# Patient Record
Sex: FEMALE | Race: WHITE | Employment: FULL TIME | URBAN - NONMETROPOLITAN AREA
[De-identification: names, ages, dates, MRNs, and addresses within clinical notes are randomized per-mention and may not be internally consistent; named-entity substitution may affect disease eponyms.]

---

## 2019-05-28 ENCOUNTER — APPOINTMENT (OUTPATIENT)
Dept: GENERAL RADIOLOGY | Age: 49
End: 2019-05-28
Payer: COMMERCIAL

## 2019-05-28 ENCOUNTER — HOSPITAL ENCOUNTER (EMERGENCY)
Age: 49
Discharge: HOME | End: 2019-05-28
Attending: FAMILY MEDICINE
Payer: COMMERCIAL

## 2019-05-28 VITALS
SYSTOLIC BLOOD PRESSURE: 146 MMHG | OXYGEN SATURATION: 96 % | RESPIRATION RATE: 18 BRPM | HEART RATE: 87 BPM | DIASTOLIC BLOOD PRESSURE: 82 MMHG | TEMPERATURE: 96.9 F

## 2019-05-28 DIAGNOSIS — M20.62 TOE DEFORMITY, LEFT: Primary | ICD-10-CM

## 2019-05-28 PROCEDURE — 99283 EMERGENCY DEPT VISIT LOW MDM: CPT

## 2019-05-28 PROCEDURE — 73660 X-RAY EXAM OF TOE(S): CPT

## 2019-05-29 ENCOUNTER — TELEPHONE (OUTPATIENT)
Dept: FAMILY MEDICINE CLINIC | Age: 49
End: 2019-05-29

## 2019-05-29 NOTE — ED PROVIDER NOTES
Musculoskeletal: She exhibits tenderness. She exhibits no edema or deformity. Left foot: There is tenderness. Feet:    There is a small bump/cyst that is noted on the left middle toe dorsal aspect at the proximal phalange area   Skin: Skin is dry. Capillary refill takes less than 2 seconds. No rash noted. Psychiatric: She has a normal mood and affect. Nursing note and vitals reviewed. DIFFERENTIAL DIAGNOSIS:   Fracture, cyst,    DIAGNOSTIC RESULTS     EKG: All EKG's are interpreted by the Emergency Department Physician who either signs or Co-signs this chart in the absence of a cardiologist.      RADIOLOGY: non-plain film images(s) such as CT, Ultrasound and MRI are read by the radiologist.  Plain radiographic images are visualized and preliminarilyinterpreted by the emergency physician unless otherwise stated below.      XR TOE LEFT (MIN 2 VIEWS) (Final result)   Result time 05/28/19 21:07:01   Final result by Dimas Arredondo MD (05/28/19 21:07:01)                Impression:    No acute fracture or dislocation. **This report has been created using voice recognition software. It may contain minor errors which are inherent in voice recognition technology. **    Final report electronically signed by Dr. Kamala Hill on 5/28/2019 9:07 PM            Narrative:    PROCEDURE: XR TOE LEFT (MIN 2 VIEWS)    CLINICAL INFORMATION: middle toe pain and lump . COMPARISON: No prior study. TECHNIQUE: An AP view of the left foot and 2 views of the third digit were obtained. FINDINGS:    Generative changes. Narrowing of the interphalangeal joints. No acute fracture or dislocation. Correlate for point tenderness. Soft tissues are unremarkable.                      LABS:   Labs Reviewed - No data to display    EMERGENCY DEPARTMENT COURSE(MDM):    Vitals:    Vitals:    05/28/19 2040   BP: (!) 146/82   Pulse: 87   Resp: 18   Temp: 96.9 °F (36.1 °C)   TempSrc: Oral   SpO2: 96%     Appreciated a small cyst or bone deformity to the left middle toe. X-rays of the left foot with specific view of the left middle toe did not show any acute fracture or dislocation. This could be some type of cyst I am unsure. Because it is causing some discomfort to the patient I will have her further follow-up with podiatry for further evaluation. Care instructions were provided. CRITICAL CARE:       CONSULTS:  None    PROCEDURES:  None    FINAL IMPRESSION      1. Toe deformity, left          DISPOSITION/PLAN   Home. Care instructions provided. Follow-up with PCP/podiatry if pain persists    PATIENT REFERRED TO:  Kris Roa DPM  [address] [address]  [phone]    Schedule an appointment as soon as possible for a visit in 1 day        DISCHARGE MEDICATIONS:  There are no discharge medications for this patient.       (Please note that portionsof this note were completed with a voice recognition program.  Efforts were made to edit the dictations but occasionally words are mis-transcribed.)    MD Noah Fajardo MD  05/29/19 2916

## 2019-05-29 NOTE — LETTER
May 29, 2019    Fan Weber  [address] [facility] [address]    Dear Jose Christie,    This letter is regarding your Emergency Department (ED) visit at [facility] on 5/28/19. Dr.Mandy LAITH Carrion wanted to make sure that you understand your discharge instructions and that you were able to fill any prescriptions that may have been ordered for you. Please contact the office at the above phone number if the ED advised you to follow up with Dr.Mandy Ana Mccullough, or if you have any further questions or needs. Also did you know -   *Visiting the ED for a non-emergency could result in higher co-pays than you would normally be subject to paying? *After business hours you can reach Call-A-Nurse so they can direct you to the most appropriate care. [facility]) practices can often offer you an appointment on the same day that you call for acute issues. *We have some [facility] offices that offer Walk-in appointments; check our website for availability in your community, [email or web address].      *Evisits are now available for patients for through [address] If you do not have MyChart and are interested, please contact the office and a staff member may assist you or go to [email or web address].     Sincerely,   Monica French MD and your [facility]

## 2020-05-05 ENCOUNTER — NURSE ONLY (OUTPATIENT)
Dept: LAB | Age: 50
End: 2020-05-05

## 2021-10-30 ENCOUNTER — HOSPITAL ENCOUNTER (EMERGENCY)
Age: 51
Discharge: HOME | End: 2021-10-30
Attending: EMERGENCY MEDICINE
Payer: COMMERCIAL

## 2021-10-30 ENCOUNTER — APPOINTMENT (OUTPATIENT)
Dept: GENERAL RADIOLOGY | Age: 51
End: 2021-10-30
Payer: COMMERCIAL

## 2021-10-30 VITALS
DIASTOLIC BLOOD PRESSURE: 66 MMHG | BODY MASS INDEX: 38.51 KG/M2 | RESPIRATION RATE: 17 BRPM | SYSTOLIC BLOOD PRESSURE: 140 MMHG | WEIGHT: 260 LBS | HEIGHT: 69 IN | HEART RATE: 88 BPM | OXYGEN SATURATION: 97 % | TEMPERATURE: 98.1 F
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
## 2021-10-30 DIAGNOSIS — S40.012A CONTUSION OF LEFT SHOULDER, INITIAL ENCOUNTER: ICD-10-CM

## 2021-10-30 PROCEDURE — 73030 X-RAY EXAM OF SHOULDER: CPT

## 2021-10-30 PROCEDURE — 6370000000 HC RX 637 (ALT 250 FOR IP): Performed by: EMERGENCY MEDICINE

## 2021-10-30 PROCEDURE — 99284 EMERGENCY DEPT VISIT MOD MDM: CPT

## 2021-10-30 RX ORDER — IBUPROFEN 800 MG/1
800 TABLET ORAL ONCE
Status: COMPLETED | OUTPATIENT
Start: 2021-10-30 | End: 2021-10-30

## 2021-10-30 RX ORDER — HYDROCODONE BITARTRATE AND ACETAMINOPHEN 5; 325 MG/1; MG/1
1 TABLET ORAL EVERY 6 HOURS PRN
Qty: 10 TABLET | Refills: 0 | Status: SHIPPED | OUTPATIENT
Start: 2021-10-30 | End: 2021-11-02

## 2021-10-30 RX ORDER — HYDROCODONE BITARTRATE AND ACETAMINOPHEN 5; 325 MG/1; MG/1
2 TABLET ORAL ONCE
Status: COMPLETED | OUTPATIENT
Start: 2021-10-30 | End: 2021-10-30

## 2021-10-30 RX ADMIN — IBUPROFEN 800 MG: 800 TABLET, FILM COATED ORAL at 19:51

## 2021-10-30 RX ADMIN — HYDROCODONE BITARTRATE AND ACETAMINOPHEN 2 TABLET: 5; 325 TABLET ORAL at 19:51

## 2021-10-30 ASSESSMENT — ENCOUNTER SYMPTOMS: BACK PAIN: 0

## 2021-10-30 ASSESSMENT — PAIN SCALES - GENERAL: PAINLEVEL_OUTOF10: 10

## 2021-10-30 NOTE — ED PROVIDER NOTES
[facility]  eMERGENCY dEPARTMENT eNCOUnter             [address] 21 COMPLAINT    Chief Complaint   Patient presents with    Shoulder Injury    Shoulder Pain       Nurses Notes reviewed and I agree except as noted in the HPI. HPI    Martha Salazar is a 54 y.o. female who presents evaluation of injury to her left shoulder, sustained just prior to arrival when she was at a football game\" slid down a hill \". She landed on her left shoulder, and has severe pain laterally and posteriorly in the left shoulder. She has difficulty moving the arm. She has no numbness, tingling, or weakness in her hand. No pain in the elbow. No other areas of injury. Current pain is 10/10, aching, constant in the left shoulder, worse with any attempts at movement or with palpation. No treatment tried prior to arrival.    REVIEW OF SYSTEMS      Review of Systems   Constitutional: Negative. HENT: Negative. Musculoskeletal: Positive for falls. Negative for back pain and neck pain. Neurological: Negative for sensory change and focal weakness. All other systems reviewed and are negative. PAST MEDICAL HISTORY     has a past medical history of Chicken pox. SURGICAL HISTORY     has a past surgical history that includes Tubal ligation and  section. CURRENT MEDICATIONS    Discharge Medication List as of 10/30/2021  8:34 PM          ALLERGIES    is allergic to sulfa antibiotics. FAMILY HISTORY    She indicated that the status of her father is unknown. She indicated that the status of her maternal grandmother is unknown.   family history includes Diabetes in her maternal grandmother; Heart Disease (age of onset: 48) in her father. SOCIAL HISTORY     reports that she has never smoked. She has never used smokeless tobacco. She reports that she does not drink alcohol and does not use drugs.     PHYSICAL EXAM       INITIAL VITALS: BP (!) 140/66   Pulse 88 Temp 98.1 °F (36.7 °C) (Oral)   Resp 17   Ht 5' 9\" (1.753 m)   Wt 260 lb (117.9 kg)   SpO2 97%   BMI 38.40 kg/m²      Physical Exam  Vitals and nursing note reviewed. Exam conducted with a chaperone present. Constitutional:       General: She is in acute distress. Appearance: She is not toxic-appearing. HENT:      Head: Atraumatic. Mouth/Throat:      Comments: mask  Eyes:      Pupils: Pupils are equal, round, and reactive to light. Cardiovascular:      Rate and Rhythm: Normal rate and regular rhythm. Pulses: Normal pulses. Heart sounds: No murmur heard. Pulmonary:      Effort: Pulmonary effort is normal. No respiratory distress. Breath sounds: Normal breath sounds. Musculoskeletal:         General: Tenderness (Left shoulder, lateral and posterior, basically no range of motion of the left shoulder due to pain. No obvious deformity, mild swelling, no bruising. Distal function is intact. No tenderness over the clavicle. She is tender over the scapula area.) present. Cervical back: Neck supple. No rigidity or tenderness. Lymphadenopathy:      Cervical: No cervical adenopathy. Skin:     General: Skin is warm and dry. Capillary Refill: Capillary refill takes less than 2 seconds. Neurological:      General: No focal deficit present. Mental Status: She is alert and oriented to person, place, and time. Psychiatric:      Comments: Pain behavior         RADIOLOGY:    XR SHOULDER LEFT (MIN 2 VIEWS)   Final Result      No fracture or dislocation. Final report electronically signed by Dr. Jovany Roger on 10/30/2021 8:10 PM                Vitals:    Vitals:    10/30/21 2045   BP: (!) 140/66   Pulse: 88   Resp: 17   Temp: 98.1 °F (36.7 °C)   TempSrc: Oral   SpO2: 97%   Weight: 260 lb (117.9 kg)   Height: 5' 9\" (1.753 m)       EMERGENCY DEPARTMENT COURSE:    She was given ibuprofen and Norco for pain. She is feeling a little better.   X-ray results and plan of care discussed with the patient. She is placed in a sling. Normal neurovascular status before and after placement. I recommended gentle range of motion with the sling in place, ice, head elevation, rest.  She will follow up with her own physician Monday. FINAL IMPRESSION      1. Fall from slip, trip, or stumble, initial encounter    2. Contusion of left shoulder, initial encounter        DISPOSITION/PLAN    DISPOSITION Decision To Discharge 10/30/2021 08:17:55 PM      PATIENT REFERRED TO:    Meek Morales , [address]  [address] [place] [address]  [phone]    Schedule an appointment as soon as possible for a visit         DISCHARGE MEDICATIONS:    Discharge Medication List as of 10/30/2021  8:34 PM      START taking these medications    Details   HYDROcodone-acetaminophen (NORCO) 5-325 MG per tablet Take 1 tablet by mouth every 6 hours as needed for Pain for up to 3 days. Intended supply: 3 days.  Take lowest dose possible to manage pain, Disp-10 tablet, R-0Normal                (Please note that portions of this note were completed with a voice recognition program.  Efforts were made to edit the dictations but occasionally words are mis-transcribed.)      Gilberto Weaver MD  10/30/21 2966

## 2021-10-30 NOTE — Clinical Note
Starla Tatum was seen and treated in our emergency department on 10/30/2021. She may return to work on 11/02/2021. If you have any questions or concerns, please don't hesitate to call.       April Renee MD

## 2021-10-30 NOTE — ED TRIAGE NOTES
Pt comes walking into ER and walked to room 1. The patient was at a local [employer] football game when she fell down a hill, She had the school trained look at it and she was told that it is probably dislocated.

## 2021-10-31 NOTE — ED NOTES
Pt stable A&O x 3 given discharge and follow up info. Pt voiced no concerns and discharged from ER to self to home. Pt ambulated out of ER with no complications .        Eddy Middleton RN  10/30/21 2040

## 2022-05-01 ENCOUNTER — HOSPITAL ENCOUNTER (EMERGENCY)
Age: 52
Discharge: HOME | End: 2022-05-01
Attending: FAMILY MEDICINE

## 2022-05-01 ENCOUNTER — APPOINTMENT (OUTPATIENT)
Dept: GENERAL RADIOLOGY | Age: 52
End: 2022-05-01

## 2022-05-01 VITALS
BODY MASS INDEX: 40.81 KG/M2 | HEIGHT: 67 IN | TEMPERATURE: 97.6 F | DIASTOLIC BLOOD PRESSURE: 93 MMHG | SYSTOLIC BLOOD PRESSURE: 177 MMHG | OXYGEN SATURATION: 100 % | RESPIRATION RATE: 16 BRPM | WEIGHT: 260 LBS | HEART RATE: 100 BPM
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
## 2022-05-01 DIAGNOSIS — M79.602 PAIN IN LEFT ARM: ICD-10-CM

## 2022-05-01 PROCEDURE — 73030 X-RAY EXAM OF SHOULDER: CPT

## 2022-05-01 PROCEDURE — 73080 X-RAY EXAM OF ELBOW: CPT

## 2022-05-01 PROCEDURE — 73110 X-RAY EXAM OF WRIST: CPT

## 2022-05-01 PROCEDURE — 99283 EMERGENCY DEPT VISIT LOW MDM: CPT

## 2022-05-01 PROCEDURE — 6370000000 HC RX 637 (ALT 250 FOR IP): Performed by: FAMILY MEDICINE

## 2022-05-01 RX ORDER — HYDROCODONE BITARTRATE AND ACETAMINOPHEN 5; 325 MG/1; MG/1
1 TABLET ORAL ONCE
Status: COMPLETED | OUTPATIENT
Start: 2022-05-01 | End: 2022-05-01

## 2022-05-01 RX ADMIN — HYDROCODONE BITARTRATE AND ACETAMINOPHEN 1 TABLET: 5; 325 TABLET ORAL at 21:23

## 2022-05-01 ASSESSMENT — PAIN SCALES - GENERAL
PAINLEVEL_OUTOF10: 10
PAINLEVEL_OUTOF10: 10

## 2022-05-01 ASSESSMENT — PAIN - FUNCTIONAL ASSESSMENT: PAIN_FUNCTIONAL_ASSESSMENT: 0-10

## 2022-05-01 ASSESSMENT — ENCOUNTER SYMPTOMS
VOMITING: 0
NAUSEA: 0

## 2022-05-02 NOTE — ED NOTES
Discharge teaching and instructions for condition explained to patient by [facility] RN. AVS reviewed. Went over prescriptions with patient. Patient voiced understanding regarding prescriptions, follow up appointments and care of self at home. Pt discharged to home in stable condition with friend.        Yoko New RN  05/01/22 0960

## 2022-05-02 NOTE — ED PROVIDER NOTES
[facility]  eMERGENCY dEPARTMENT eNCOUnter          [address] [facility]       Chief Complaint   Patient presents with    Fall     let arm, shoulder, elbow pain       Nurses Notes reviewed and I agree except as noted in the HPI. HISTORY OF PRESENT ILLNESS    Getachew Macias is a 54 y.o. female who presents post fall. Patient notes left shoulder,left elbow,left wrist pain. Pain severe. Denies alleviating measures prior to arrival.         REVIEW OF SYSTEMS     Review of Systems   Constitutional: Positive for activity change. Negative for chills and fever. Gastrointestinal: Negative for nausea and vomiting. Musculoskeletal: Positive for arthralgias. Negative for joint swelling. Skin: Negative for rash and wound. All other systems reviewed and are negative. PAST MEDICAL HISTORY    has a past medical history of Chicken pox. SURGICAL HISTORY      has a past surgical history that includes Tubal ligation and  section. CURRENT MEDICATIONS     There are no discharge medications for this patient. ALLERGIES     is allergic to sulfa antibiotics. FAMILY HISTORY     She indicated that the status of her father is unknown. She indicated that the status of her maternal grandmother is unknown.   family history includes Diabetes in her maternal grandmother; Heart Disease (age of onset: 48) in her father. SOCIAL HISTORY      reports that she has never smoked. She has never used smokeless tobacco. She reports that she does not drink alcohol and does not use drugs. PHYSICAL EXAM     INITIAL VITALS:  height is 5' 7\" (1.702 m) and weight is 260 lb (117.9 kg). Her temperature is 97.6 °F (36.4 °C). Her blood pressure is 177/93 (abnormal) and her pulse is 100. Her respiration is 16 and oxygen saturation is 100%. Physical Exam  Vitals and nursing note reviewed. Constitutional:       General: She is in acute distress. HENT:      Head: Normocephalic and atraumatic. Musculoskeletal:         General: Tenderness and signs of injury present. No swelling or deformity. Left shoulder: No swelling, deformity, tenderness or bony tenderness. Decreased range of motion. Left elbow: No swelling, deformity or lacerations. Decreased range of motion. Tenderness present in radial head and olecranon process. Left wrist: Tenderness present. No swelling, deformity, snuff box tenderness or crepitus. Decreased range of motion. Normal pulse. Neurological:      Mental Status: She is alert. DIFFERENTIAL DIAGNOSIS:   Contusion,fracture nos,     DIAGNOSTIC RESULTS     EKG: All EKG's are interpreted by the Emergency Department Physician who either signs or Co-signs this chart in the absence of a cardiologist.      RADIOLOGY: non-plain film images(s) such as CT, Ultrasound and MRI are read by the radiologist.  Radiographs of the left wrist 4 views       Comparison: None       Findings:       No acute fracture, dislocation or radiopaque foreign body. Normal    scapholunate interval. Mild degenerative changes.           Impression   Impression:   1. No acute process in the left wrist.       This document has been electronically signed by: Lazaro Reyez DO on    05/01/2022 09:35 PM     Radiographs of the left elbow 4 views       Comparison: None       Findings:       No joint effusion, acute fracture, dislocation or radiopaque foreign body. There are mild degenerative changes in the ulnohumeral joint.           Impression   Impression:   1. No acute process in the left elbow.       This document has been electronically signed by: Lazaro Reyez DO on    05/01/2022 09:32 PM     Radiographs of the left shoulder 3 views       Comparison: 10/30/2021 07:47 PM EDT (06:47 PM CDT) : DX,SR: XR SHOULDER       Findings:       No acute fracture, dislocation or radiopaque foreign body. Mild    degenerative changes.           Impression   Impression:   1.  No acute process in the left shoulder.       This document has been electronically signed by: Apoorva Goel. DO Aissatou on    05/01/2022 09:36 PM         LABS:   Labs Reviewed - No data to display    EMERGENCY DEPARTMENT COURSE:   Vitals:    Vitals:    05/01/22 2053   BP: (!) 177/93   Pulse: 100   Resp: 16   Temp: 97.6 °F (36.4 °C)   SpO2: 100%   Weight: 260 lb (117.9 kg)   Height: 5' 7\" (1.702 m)     Patient notes left shoulder,left elbow and left wrist pain. All extremities normal contour and shape. X rays left shoulder,left elbow and wrist negative for fracture or dislocation. At this time will recommend OTC analgesics for pain. Ice to affected areas. Activity as tolerated. PROCEDURES:  None    FINAL IMPRESSION      1. Fall, initial encounter    2. Pain in left arm          DISPOSITION/PLAN   Home. Care instructions provided. Follow up with PCP or ED as needed. PATIENT REFERRED TO:  Jimena Angulo, APRN - CNP  [address] [facility] [id number]  [phone]    Call in 3 days  If symptoms worsen, As needed      DISCHARGE MEDICATIONS:  There are no discharge medications for this patient.       (Please note that portions of this note were completed with a voice recognition program.  Efforts were made to edit the dictations but occasionally words are mis-transcribed.)    MD Nahed Clark MD  05/01/22 2201

## 2022-08-03 ENCOUNTER — HOSPITAL ENCOUNTER (EMERGENCY)
Age: 52
Discharge: HOME | End: 2022-08-03
Attending: EMERGENCY MEDICINE
Payer: COMMERCIAL

## 2022-08-03 VITALS
SYSTOLIC BLOOD PRESSURE: 127 MMHG | TEMPERATURE: 97.8 F | RESPIRATION RATE: 18 BRPM | DIASTOLIC BLOOD PRESSURE: 85 MMHG | OXYGEN SATURATION: 100 % | HEART RATE: 92 BPM

## 2022-08-03 DIAGNOSIS — L23.7 ALLERGIC CONTACT DERMATITIS DUE TO PLANTS, EXCEPT FOOD: Primary | ICD-10-CM

## 2022-08-03 PROCEDURE — 99283 EMERGENCY DEPT VISIT LOW MDM: CPT

## 2022-08-03 RX ORDER — PREDNISONE 10 MG/1
TABLET ORAL
Qty: 36 TABLET | Refills: 0 | Status: SHIPPED | OUTPATIENT
Start: 2022-08-03

## 2022-08-03 ASSESSMENT — PAIN - FUNCTIONAL ASSESSMENT: PAIN_FUNCTIONAL_ASSESSMENT: NONE - DENIES PAIN

## 2022-08-03 NOTE — ED NOTES
Pt given discharge instructions. Verbalized understanding and use of med. Pt left ambulatory per self. Pt in stable condition.       Regis Bird RN  08/03/22 1834

## 2022-08-03 NOTE — DISCHARGE INSTRUCTIONS
Continue Benadryl for further itching or problems. Take prednisone daily. Monitor for difficulty breathing further rash or other problems.

## 2022-08-03 NOTE — ED PROVIDER NOTES
movements are normal  Respiratory: No respiratory distress  Musculoskeletal:  Intact distal pulses, No edema, No tenderness, No cyanosis, No clubbing. . No tenderness to palpation or major deformities noted. Integument: Erythematous lesions on the head face ears upper chest and hands. No petechia or bulla. Neurologic:  Alert & appropriate   Psychiatric:  Affect normal    EKG                      RADIOLOGY    No orders to display           SCREENINGS  /85   Pulse 92   Temp 97.8 °F (36.6 °C) (Oral)   Resp 18   SpO2 100%      No orders to display       Screening For Hypertension and Follow-up (#317)  patient informed that blood pressure is normal but should always be re-assessed by primary care      Screening For Tobacco Use and Cessation Intervention (#226):   reports that she has never smoked. She has never used smokeless tobacco.  Non-smoker not applicable for screen      PROCEDURES    none      CONSULTS:  None        ED COURSE & MEDICAL DECISION MAKING    Pertinent Labs & Imaging studies reviewed. (See chart for details)  Patient has contact dermatitis she states from pine exposure. Neurovascular exam is otherwise normal.  Her lungs are clear. Not suspicious for anaphylaxis or anaphylactoid type reaction. Counseled in regards to care and treatment. FINAL IMPRESSION    1. Allergic contact dermatitis due to plants, except food         PATIENT REFERRED TO:  Pk Angulo, APRN - [facility]  [address] [id number] [phone]    Call   For evaluation    DISCHARGE MEDICATIONS:  New Prescriptions    PREDNISONE (DELTASONE) 10 MG TABLET    6 p.o. day 1, then 4 p.o. q. day for 3 days, 3 p.o. q. day for 3 days, 2 p.o. q. day for 3 days, one p.o. q. day for 3 days           Shellie Fleming MD  08/03/22 8506

## 2023-04-22 ENCOUNTER — APPOINTMENT (OUTPATIENT)
Dept: GENERAL RADIOLOGY | Age: 53
End: 2023-04-22
Payer: COMMERCIAL

## 2023-04-22 ENCOUNTER — HOSPITAL ENCOUNTER (EMERGENCY)
Age: 53
Discharge: HOME | End: 2023-04-22
Attending: EMERGENCY MEDICINE
Payer: COMMERCIAL

## 2023-04-22 VITALS
SYSTOLIC BLOOD PRESSURE: 150 MMHG | RESPIRATION RATE: 16 BRPM | HEART RATE: 77 BPM | OXYGEN SATURATION: 100 % | TEMPERATURE: 97.6 F | WEIGHT: 252 LBS | HEIGHT: 67 IN | DIASTOLIC BLOOD PRESSURE: 76 MMHG | BODY MASS INDEX: 39.55 KG/M2

## 2023-04-22 DIAGNOSIS — M79.672 ACUTE FOOT PAIN, LEFT: Primary | ICD-10-CM

## 2023-04-22 PROCEDURE — 99283 EMERGENCY DEPT VISIT LOW MDM: CPT

## 2023-04-22 PROCEDURE — 73630 X-RAY EXAM OF FOOT: CPT

## 2023-04-22 ASSESSMENT — PAIN - FUNCTIONAL ASSESSMENT
PAIN_FUNCTIONAL_ASSESSMENT: 0-10
PAIN_FUNCTIONAL_ASSESSMENT: 0-10

## 2023-04-22 ASSESSMENT — PAIN DESCRIPTION - DESCRIPTORS
DESCRIPTORS: ACHING
DESCRIPTORS: ACHING

## 2023-04-22 ASSESSMENT — PAIN DESCRIPTION - PAIN TYPE
TYPE: ACUTE PAIN
TYPE: ACUTE PAIN

## 2023-04-22 ASSESSMENT — PAIN SCALES - GENERAL
PAINLEVEL_OUTOF10: 8
PAINLEVEL_OUTOF10: 8

## 2023-04-22 ASSESSMENT — PAIN DESCRIPTION - ORIENTATION
ORIENTATION: LEFT;UPPER
ORIENTATION: LEFT

## 2023-04-22 ASSESSMENT — PAIN DESCRIPTION - LOCATION
LOCATION: FOOT
LOCATION: FOOT

## 2023-04-22 NOTE — ED PROVIDER NOTES
[facility]  eMERGENCY dEPARTMENT eNCOUnter             [address] COMPLAINT    Chief Complaint   Patient presents with    Foot Pain     Pain on top of left foot. No edema or bruising. Denies injury. Nurses Notes reviewed and I agree except as noted in the HPI. HPI    Fransico Lopez is a 50 y.o. female who presents stating that she has been having pain on the dorsum of her left foot with no known injury. This has been going on for a while, but much worse today. 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 It does not seem to matter what kind of shoes she wears, her foot hurts. If she rests for a while, it feels better, but as soon as she stands up, the pain is bad. If she walks for a while it starts to get a little better. She is taking ibuprofen 800 mg every 8 hours with some relief. She denies any other joint pain. REVIEW OF SYSTEMS      Review of Systems   Constitutional: Negative. Neurological:  Negative for focal weakness. All other systems reviewed and are negative. PAST MEDICAL HISTORY     has a past medical history of Chicken pox. SURGICAL HISTORY     has a past surgical history that includes Tubal ligation and  section. CURRENT MEDICATIONS    Discharge Medication List as of 2023  6:46 PM          ALLERGIES    is allergic to sulfa antibiotics. FAMILY HISTORY    She indicated that the status of her father is unknown. She indicated that the status of her maternal grandmother is unknown.   family history includes Diabetes in her maternal grandmother; Heart Disease (age of onset: 48) in her father. SOCIAL HISTORY     reports that she has never smoked. She has never used smokeless tobacco. She reports that she does not drink alcohol and does not use drugs.     PHYSICAL EXAM       INITIAL VITALS: BP (!) 150/76   Pulse 77   Temp 97.6 °F (36.4 °C) (Temporal)

## 2023-04-22 NOTE — ED NOTES
Pt pink, warm and dry, breathing with ease. RICE encouraged. AVS reviewed. Denies questions or concerns. Pt remains alert and oriented. Pt discharged in stable condition with spouse.       Sesar Johnson RN  04/22/23 5911

## 2023-04-22 NOTE — DISCHARGE INSTRUCTIONS
Rest, elevate, ice the area as needed for pain and swelling. Crutches for partial weight bearing on the left side as tolerated. Ibuprofen 800 mg every 8 hours as needed for pain and inflammation. Follow up with a podiatrist, number for the [id number] [address] podiatrist given above.

## 2024-05-20 ENCOUNTER — HOSPITAL ENCOUNTER (EMERGENCY)
Age: 54
Discharge: HOME | End: 2024-05-20
Attending: FAMILY MEDICINE
Payer: COMMERCIAL

## 2024-05-20 ENCOUNTER — APPOINTMENT (OUTPATIENT)
Dept: GENERAL RADIOLOGY | Age: 54
End: 2024-05-20
Payer: COMMERCIAL

## 2024-05-20 VITALS
DIASTOLIC BLOOD PRESSURE: 93 MMHG | TEMPERATURE: 97.4 F | HEART RATE: 86 BPM | SYSTOLIC BLOOD PRESSURE: 131 MMHG | RESPIRATION RATE: 16 BRPM | OXYGEN SATURATION: 100 % | HEIGHT: 69 IN | BODY MASS INDEX: 37.33 KG/M2 | WEIGHT: 252 LBS

## 2024-05-20 DIAGNOSIS — S52.501A CLOSED FRACTURE OF DISTAL END OF RIGHT RADIUS, UNSPECIFIED FRACTURE MORPHOLOGY, INITIAL ENCOUNTER: Primary | ICD-10-CM

## 2024-05-20 PROCEDURE — 73090 X-RAY EXAM OF FOREARM: CPT

## 2024-05-20 PROCEDURE — 6370000000 HC RX 637 (ALT 250 FOR IP): Performed by: FAMILY MEDICINE

## 2024-05-20 PROCEDURE — 73110 X-RAY EXAM OF WRIST: CPT

## 2024-05-20 PROCEDURE — 99283 EMERGENCY DEPT VISIT LOW MDM: CPT

## 2024-05-20 RX ORDER — HYDROCODONE BITARTRATE AND ACETAMINOPHEN 5; 325 MG/1; MG/1
2 TABLET ORAL ONCE
Status: COMPLETED | OUTPATIENT
Start: 2024-05-20 | End: 2024-05-20

## 2024-05-20 RX ADMIN — HYDROCODONE BITARTRATE AND ACETAMINOPHEN 2 TABLET: 5; 325 TABLET ORAL at 19:18

## 2024-05-20 ASSESSMENT — PAIN DESCRIPTION - LOCATION: LOCATION: WRIST

## 2024-05-20 ASSESSMENT — ENCOUNTER SYMPTOMS
NAUSEA: 0
VOMITING: 0

## 2024-05-20 ASSESSMENT — PAIN DESCRIPTION - ORIENTATION: ORIENTATION: LEFT

## 2024-05-20 ASSESSMENT — PAIN - FUNCTIONAL ASSESSMENT: PAIN_FUNCTIONAL_ASSESSMENT: 0-10

## 2024-05-20 ASSESSMENT — PAIN SCALES - GENERAL: PAINLEVEL_OUTOF10: 8

## 2024-05-20 NOTE — ED NOTES
Printer is not working at this time, unable to print out sling paperwork of providing pt the sling.  Education provided, pt verbalized understanding.  Verbalized understanding of the \"patient acknowledgement of delivery and patient instructions\".

## 2024-05-20 NOTE — ED PROVIDER NOTES
nursing note reviewed.   Constitutional:       General: She is not in acute distress.  Musculoskeletal:         General: Swelling, tenderness, deformity and signs of injury present.   Skin:     General: Skin is dry.      Capillary Refill: Capillary refill takes less than 2 seconds.      Findings: No bruising or rash.   Neurological:      General: No focal deficit present.      Mental Status: She is alert.      Sensory: No sensory deficit.          DIFFERENTIAL DIAGNOSIS:       DIAGNOSTIC RESULTS     EKG: All EKG's are interpreted by the Emergency Department Physician who either signs or Co-signs this chart in the absence of a cardiologist.      RADIOLOGY: non-plain film images(s) such as CT, Ultrasound and MRI are read by the radiologist.  XR RADIUS ULNA LEFT (2 VIEWS)  Order: [id number]  Status: Final result       Visible to patient: No (not released)       Next appt: None    0 Result Notes  Details    Reading Physician Reading Date Result Priority   Nish Sena MD  [phone] 5/20/2024      Narrative & Impression  2 view left forearm     Comparison: None     Findings:  Minimal cortical irregularity in the distal radial styloid.  Left elbow is intact. No joint effusion.  No significant arthritic change.  No radiopaque foreign body.     IMPRESSION:  Minimal cortical irregularity of the distal radial styloid, suspicious for   nondisplaced fracture.     This document has been electronically signed by: Anil Sena MD on   05/20/2024 06:42 PM           Specimen Collected: 05/20/24 17:42 EDT Last Resulted: 05/20/24 18:42 EDT             XR WRIST LEFT (MIN 3 VIEWS)  Order: [id number]  Status: Final result       Visible to patient: No (not released)       Next appt: None    0 Result Notes  Details    Reading Physician Reading Date Result Priority   Nish Sena MD  [phone] 5/20/2024      Narrative & Impression  4 view left wrist     Comparison: DX/SR - XR WRIST LEFT (MIN 3 VIEWS) - 05/01/2022 09:02 PM EDT

## 2024-05-20 NOTE — DISCHARGE INSTRUCTIONS
Splint as applied. Ice, elevation,motrin for pain. Follow up with ortho at [facility] tomorrow 12:40 pm.

## 2024-05-20 NOTE — ED NOTES
Pt ambulated out of department at this time with her  to return home, gait steady, respirations easy and unlabored skin is pink, warm, and dry.

## 2024-05-28 ENCOUNTER — HOSPITAL ENCOUNTER (OUTPATIENT)
Dept: GENERAL RADIOLOGY | Age: 54
Discharge: HOME | End: 2024-05-28
Attending: ORTHOPAEDIC SURGERY
Payer: COMMERCIAL

## 2024-05-28 ENCOUNTER — HOSPITAL ENCOUNTER (OUTPATIENT)
Age: 54
Discharge: HOME | End: 2024-05-28
Payer: COMMERCIAL

## 2024-05-28 DIAGNOSIS — S52.515D CLOSED NONDISPLACED FRACTURE OF STYLOID PROCESS OF LEFT RADIUS WITH ROUTINE HEALING: ICD-10-CM

## 2024-05-28 PROCEDURE — 73110 X-RAY EXAM OF WRIST: CPT

## 2024-06-15 ENCOUNTER — HOSPITAL ENCOUNTER (OUTPATIENT)
Age: 54
End: 2024-06-15

## 2024-06-18 ENCOUNTER — HOSPITAL ENCOUNTER (OUTPATIENT)
Age: 54
Discharge: HOME | End: 2024-06-18
Payer: COMMERCIAL

## 2024-06-18 ENCOUNTER — HOSPITAL ENCOUNTER (OUTPATIENT)
Dept: GENERAL RADIOLOGY | Age: 54
Discharge: HOME | End: 2024-06-18
Payer: COMMERCIAL

## 2024-06-18 DIAGNOSIS — S52.515D NONDISPLACED FRACTURE OF LEFT RADIAL STYLOID PROCESS, SUBSEQUENT ENCOUNTER FOR CLOSED FRACTURE WITH ROUTINE HEALING: ICD-10-CM

## 2024-06-18 PROCEDURE — 73110 X-RAY EXAM OF WRIST: CPT

## 2024-07-09 ENCOUNTER — HOSPITAL ENCOUNTER (OUTPATIENT)
Age: 54
Discharge: HOME | End: 2024-07-09
Payer: COMMERCIAL

## 2024-07-09 ENCOUNTER — HOSPITAL ENCOUNTER (OUTPATIENT)
Dept: GENERAL RADIOLOGY | Age: 54
Discharge: HOME | End: 2024-07-09
Attending: ORTHOPAEDIC SURGERY
Payer: COMMERCIAL

## 2024-07-09 DIAGNOSIS — S52.515D CLOSED NONDISPLACED FRACTURE OF STYLOID PROCESS OF LEFT RADIUS WITH ROUTINE HEALING: ICD-10-CM

## 2024-07-09 PROCEDURE — 73110 X-RAY EXAM OF WRIST: CPT

## 2024-08-06 ENCOUNTER — HOSPITAL ENCOUNTER (OUTPATIENT)
Age: 54
Discharge: HOME | End: 2024-08-06
Payer: COMMERCIAL

## 2024-08-06 ENCOUNTER — HOSPITAL ENCOUNTER (OUTPATIENT)
Dept: GENERAL RADIOLOGY | Age: 54
Discharge: HOME | End: 2024-08-06
Payer: COMMERCIAL

## 2024-08-06 DIAGNOSIS — S52.515D NONDISPLACED FRACTURE OF LEFT RADIAL STYLOID PROCESS, SUBSEQUENT ENCOUNTER FOR CLOSED FRACTURE WITH ROUTINE HEALING: ICD-10-CM

## 2024-08-06 PROCEDURE — 73110 X-RAY EXAM OF WRIST: CPT
